# Patient Record
Sex: MALE | URBAN - METROPOLITAN AREA
[De-identification: names, ages, dates, MRNs, and addresses within clinical notes are randomized per-mention and may not be internally consistent; named-entity substitution may affect disease eponyms.]

---

## 2024-06-21 ENCOUNTER — APPOINTMENT (RX ONLY)
Dept: URBAN - METROPOLITAN AREA CLINIC 80 | Facility: CLINIC | Age: 24
Setting detail: DERMATOLOGY
End: 2024-06-21

## 2024-06-21 DIAGNOSIS — K11.6 MUCOCELE OF SALIVARY GLAND: ICD-10-CM

## 2024-06-21 PROCEDURE — ? OTC TREATMENT REGIMEN

## 2024-06-21 PROCEDURE — 99203 OFFICE O/P NEW LOW 30 MIN: CPT

## 2024-06-21 PROCEDURE — ? ADDITIONAL NOTES

## 2024-06-21 PROCEDURE — ? COUNSELING

## 2024-06-21 ASSESSMENT — LOCATION SIMPLE DESCRIPTION DERM: LOCATION SIMPLE: RIGHT INFERIOR LIP

## 2024-06-21 ASSESSMENT — LOCATION ZONE DERM: LOCATION ZONE: LIP

## 2024-06-21 ASSESSMENT — LOCATION DETAILED DESCRIPTION DERM: LOCATION DETAILED: RIGHT INFERIOR VERMILION LIP

## 2024-06-21 NOTE — PROCEDURE: OTC TREATMENT REGIMEN
Patient Specific Otc Recommendations (Will Not Stick From Patient To Patient): CervaVe healing ointment daily prn dryness
Detail Level: Zone
Samples Given: CeraVe healing ointment

## 2024-06-21 NOTE — HPI: RASH (PATIENT REPORTED)
Where Is Your Rash Located?: Lower lip
Additional Comments (Use Complete Sentences): Patient believes the original cause of the inflammation could have been from an allergic. Patient met with a few dermatologist. Patient was given a prescription for Fluocinolone for 1 year and believes this made it worse. Patient was informed it might be caused by Aquaphor so he discontinued use of the aquaphor with no improvement. Patient denies itching, pain or irritation to the affected area.

## 2024-06-21 NOTE — PROCEDURE: ADDITIONAL NOTES
Additional Notes: Patient states shortly after having a mucocele removal 4 years ago he had dryness and peeling around his mouth. Patient states for 3.5 yrs he has had consistent persist swelling of the lower lip. Denies intermitten swelling, s/s of Chrohn's disease. Diagnosed lip biopsy-pt declines. Patient is advised to f/u with maxillofacial surgeon to evaluate for another mucocele, scar tissue, etc. Discussed bx in reserve. Sample of cerave healing ointment given to pt.
Detail Level: Simple
Render Risk Assessment In Note?: no